# Patient Record
Sex: MALE | URBAN - METROPOLITAN AREA
[De-identification: names, ages, dates, MRNs, and addresses within clinical notes are randomized per-mention and may not be internally consistent; named-entity substitution may affect disease eponyms.]

---

## 2021-01-07 ENCOUNTER — OFFICE VISIT (OUTPATIENT)
Dept: URBAN - METROPOLITAN AREA CLINIC 88 | Facility: CLINIC | Age: 76
End: 2021-01-07
Payer: MEDICARE

## 2021-01-07 DIAGNOSIS — H04.123 DRY EYE SYNDROME OF BILATERAL LACRIMAL GLANDS: Chronic | ICD-10-CM

## 2021-01-07 DIAGNOSIS — H25.13 AGE-RELATED NUCLEAR CATARACT, BILATERAL: Chronic | ICD-10-CM

## 2021-01-07 DIAGNOSIS — H01.8 OTHER SPECIFIED INFLAMMATIONS OF EYELID: Chronic | ICD-10-CM

## 2021-01-07 PROCEDURE — 99204 OFFICE O/P NEW MOD 45 MIN: CPT | Performed by: OPTOMETRIST

## 2021-01-07 ASSESSMENT — KERATOMETRY
OD: 43.63
OS: 43.75

## 2021-01-07 ASSESSMENT — INTRAOCULAR PRESSURE
OS: 13
OD: 14
OS: 14

## 2021-01-07 ASSESSMENT — VISUAL ACUITY: OS: 20/25

## 2021-01-07 NOTE — IMPRESSION/PLAN
Impression: Other specified inflammations of eyelid: H01.8. Plan: Discussed cause of ocular irritation with patient in detail. Lid hygiene to be performed with baby shampoo and a soft cloth to clean the lash and lid margin. Discussed changing bed linens.

## 2021-01-07 NOTE — IMPRESSION/PLAN
Impression: Open angle with borderline findings, low risk, bilateral: H40.013. Plan: Mild asymmetry of CD ratio. Low IOP. OCT attempted, unable.  Baseline fundus photos at next exam.

## 2021-01-07 NOTE — IMPRESSION/PLAN
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 Plan: Patient educated to condition. Pt reports having scan of the brain with normal results. No surgery is recommended at that time. Patient knows to RTC if symptoms of decreased side vision is experienced.

## 2021-01-07 NOTE — IMPRESSION/PLAN
Impression: Conjunctivitis - Allergic: H10.413. Plan: Discussed status in detail. Taper Prednisolone BID OS x 3 days, d/c Polytrim. Recommend cool compresses and Pataday QD OU.

## 2021-02-25 ENCOUNTER — OFFICE VISIT (OUTPATIENT)
Dept: URBAN - METROPOLITAN AREA CLINIC 88 | Facility: CLINIC | Age: 76
End: 2021-02-25
Payer: MEDICARE

## 2021-02-25 DIAGNOSIS — H10.413 CONJUNCTIVITIS - ALLERGIC: Primary | ICD-10-CM

## 2021-02-25 DIAGNOSIS — H43.811 VITREOUS DEGENERATION, RIGHT EYE: Chronic | ICD-10-CM

## 2021-02-25 DIAGNOSIS — H40.013 OPEN ANGLE WITH BORDERLINE FINDINGS, LOW RISK, BILATERAL: ICD-10-CM

## 2021-02-25 PROCEDURE — 99214 OFFICE O/P EST MOD 30 MIN: CPT | Performed by: OPTOMETRIST

## 2021-02-25 RX ORDER — OLOPATADINE HYDROCHLORIDE 1 MG/ML
0.1 % SOLUTION/ DROPS OPHTHALMIC
Qty: 5 | Refills: 5 | Status: INACTIVE
Start: 2021-02-25 | End: 2021-03-02

## 2021-02-25 ASSESSMENT — INTRAOCULAR PRESSURE
OD: 13
OS: 13

## 2021-02-25 NOTE — IMPRESSION/PLAN
Impression: Conjunctivitis - Allergic: H10.413. Plan: Discussed condition with patient in detail. Recommend treatment with Preservative Free Artificial tears QID. Pataday sent to pharmacy at pt request.  RTC if symptoms increase or any new problems experienced.

## 2021-02-25 NOTE — IMPRESSION/PLAN
Impression: Open angle with borderline findings, low risk, bilateral: H40.013. Plan: Discussed status of examination with patient. Risk based on ON appearance. Baseline OCT today. Low signal strength with asymmetry of disc size. Some data drop out OS. Low pressure and no family history. Patient to follow up with primary eye care provider in [place]. Recommend additional glaucoma monitoring with pachymetry, gonioscopy, VF 24-2, and ON OCT (repeat) in 9 months after returning from [place]. Patient understands risk associated with condition and need for monitoring.

## 2021-02-25 NOTE — IMPRESSION/PLAN
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 Plan: Discussed status, normal pupil with no signs of Maurizio's. Referral for surgery deferred today by patient.

## 2022-03-03 ENCOUNTER — OFFICE VISIT (OUTPATIENT)
Dept: URBAN - METROPOLITAN AREA CLINIC 88 | Facility: CLINIC | Age: 77
End: 2022-03-03
Payer: MEDICARE

## 2022-03-03 DIAGNOSIS — H43.813 VITREOUS DEGENERATION, BILATERAL: ICD-10-CM

## 2022-03-03 DIAGNOSIS — H02.412 MECHANICAL PTOSIS OF LEFT EYELID: ICD-10-CM

## 2022-03-03 PROCEDURE — 99213 OFFICE O/P EST LOW 20 MIN: CPT | Performed by: OPTOMETRIST

## 2022-03-03 PROCEDURE — 76514 ECHO EXAM OF EYE THICKNESS: CPT | Performed by: OPTOMETRIST

## 2022-03-03 PROCEDURE — 92133 CPTRZD OPH DX IMG PST SGM ON: CPT | Performed by: OPTOMETRIST

## 2022-03-03 ASSESSMENT — INTRAOCULAR PRESSURE
OD: 13
OS: 13

## 2022-03-03 NOTE — IMPRESSION/PLAN
Impression: Open angle with borderline findings, low risk, bilateral: H40.013. Plan: Discussed status of examination with patient. Risk based on ON appearance. OCT today, stable vs last. Pachy, thick OU. Low pressure and no family history. Patient to follow up with primary eye care provider in [place]. Patient understands risk associated with condition and need for monitoring.

## 2023-10-13 ENCOUNTER — HOSPITAL ENCOUNTER (OUTPATIENT)
Dept: RADIOLOGY | Facility: HOSPITAL | Age: 78
Discharge: HOME | End: 2023-10-13
Payer: MEDICARE

## 2023-10-13 DIAGNOSIS — T84.498A OTHER MECHANICAL COMPLICATION OF OTHER INTERNAL ORTHOPEDIC DEVICES, IMPLANTS AND GRAFTS, INITIAL ENCOUNTER (CMS-HCC): ICD-10-CM

## 2023-10-13 PROCEDURE — 72128 CT CHEST SPINE W/O DYE: CPT

## 2023-10-13 PROCEDURE — 72146 MRI CHEST SPINE W/O DYE: CPT

## 2023-10-18 ENCOUNTER — PREP FOR PROCEDURE (OUTPATIENT)
Dept: PREOP | Facility: HOSPITAL | Age: 78
End: 2023-10-18
Payer: MEDICARE

## 2023-10-18 DIAGNOSIS — T84.498S FAILED ORTHOPEDIC IMPLANT, SEQUELA: Primary | ICD-10-CM

## 2023-10-18 PROBLEM — T84.498A FAILED ORTHOPEDIC IMPLANT (CMS-HCC): Status: ACTIVE | Noted: 2023-10-18

## 2023-11-01 ENCOUNTER — CLINICAL SUPPORT (OUTPATIENT)
Dept: PREADMISSION TESTING | Facility: HOSPITAL | Age: 78
End: 2023-11-01
Payer: MEDICARE

## 2023-11-01 VITALS
HEART RATE: 82 BPM | OXYGEN SATURATION: 96 % | SYSTOLIC BLOOD PRESSURE: 101 MMHG | TEMPERATURE: 96.8 F | BODY MASS INDEX: 25.11 KG/M2 | WEIGHT: 169.53 LBS | DIASTOLIC BLOOD PRESSURE: 67 MMHG | HEIGHT: 69 IN | RESPIRATION RATE: 18 BRPM

## 2023-11-01 DIAGNOSIS — Z01.818 PREOPERATIVE TESTING: Primary | ICD-10-CM

## 2023-11-01 LAB
ABO GROUP (TYPE) IN BLOOD: NORMAL
ANION GAP SERPL CALC-SCNC: 8 MMOL/L
ANTIBODY SCREEN: NORMAL
APPEARANCE UR: CLEAR
BILIRUB UR STRIP.AUTO-MCNC: NEGATIVE MG/DL
BUN SERPL-MCNC: 36 MG/DL (ref 8–25)
CALCIUM SERPL-MCNC: 9.5 MG/DL (ref 8.5–10.4)
CHLORIDE SERPL-SCNC: 106 MMOL/L (ref 97–107)
CO2 SERPL-SCNC: 26 MMOL/L (ref 24–31)
COLOR UR: NORMAL
CREAT SERPL-MCNC: 1.1 MG/DL (ref 0.4–1.6)
ERYTHROCYTE [DISTWIDTH] IN BLOOD BY AUTOMATED COUNT: 14.3 % (ref 11.5–14.5)
GFR SERPL CREATININE-BSD FRML MDRD: 70 ML/MIN/1.73M*2
GLUCOSE SERPL-MCNC: 113 MG/DL (ref 65–99)
GLUCOSE UR STRIP.AUTO-MCNC: NORMAL MG/DL
HCT VFR BLD AUTO: 37.5 % (ref 41–52)
HGB BLD-MCNC: 12.7 G/DL (ref 13.5–17.5)
KETONES UR STRIP.AUTO-MCNC: NEGATIVE MG/DL
LEUKOCYTE ESTERASE UR QL STRIP.AUTO: NEGATIVE
MCH RBC QN AUTO: 31.3 PG (ref 26–34)
MCHC RBC AUTO-ENTMCNC: 33.9 G/DL (ref 32–36)
MCV RBC AUTO: 92 FL (ref 80–100)
NITRITE UR QL STRIP.AUTO: NEGATIVE
NRBC BLD-RTO: 0 /100 WBCS (ref 0–0)
PH UR STRIP.AUTO: 5.5 [PH]
PLATELET # BLD AUTO: 198 X10*3/UL (ref 150–450)
PMV BLD AUTO: 9.9 FL (ref 7.5–11.5)
POTASSIUM SERPL-SCNC: 4.2 MMOL/L (ref 3.4–5.1)
PROT UR STRIP.AUTO-MCNC: NEGATIVE MG/DL
RBC # BLD AUTO: 4.06 X10*6/UL (ref 4.5–5.9)
RBC # UR STRIP.AUTO: NEGATIVE /UL
RH FACTOR (ANTIGEN D): NORMAL
SODIUM SERPL-SCNC: 140 MMOL/L (ref 133–145)
SP GR UR STRIP.AUTO: 1.02
UROBILINOGEN UR STRIP.AUTO-MCNC: NORMAL MG/DL
WBC # BLD AUTO: 6.7 X10*3/UL (ref 4.4–11.3)

## 2023-11-01 PROCEDURE — 81003 URINALYSIS AUTO W/O SCOPE: CPT

## 2023-11-01 PROCEDURE — 85027 COMPLETE CBC AUTOMATED: CPT

## 2023-11-01 PROCEDURE — 87081 CULTURE SCREEN ONLY: CPT | Mod: TRILAB

## 2023-11-01 PROCEDURE — 36415 COLL VENOUS BLD VENIPUNCTURE: CPT

## 2023-11-01 PROCEDURE — 99203 OFFICE O/P NEW LOW 30 MIN: CPT | Performed by: NURSE PRACTITIONER

## 2023-11-01 PROCEDURE — 86900 BLOOD TYPING SEROLOGIC ABO: CPT

## 2023-11-01 PROCEDURE — 80048 BASIC METABOLIC PNL TOTAL CA: CPT

## 2023-11-01 RX ORDER — FLUTICASONE PROPIONATE 50 MCG
2 SPRAY, SUSPENSION (ML) NASAL NIGHTLY PRN
COMMUNITY
End: 2023-11-08 | Stop reason: HOSPADM

## 2023-11-01 RX ORDER — FLUVOXAMINE MALEATE 100 MG/1
150 TABLET, COATED ORAL DAILY
COMMUNITY

## 2023-11-01 RX ORDER — PANTOPRAZOLE SODIUM 40 MG/1
40 TABLET, DELAYED RELEASE ORAL
COMMUNITY

## 2023-11-01 RX ORDER — BUPROPION HYDROCHLORIDE 150 MG/1
150 TABLET ORAL EVERY MORNING
COMMUNITY
End: 2023-11-08 | Stop reason: HOSPADM

## 2023-11-01 RX ORDER — ACETAMINOPHEN 500 MG
5000 TABLET ORAL DAILY
COMMUNITY

## 2023-11-01 RX ORDER — GUAIFENESIN 600 MG/1
600 TABLET, EXTENDED RELEASE ORAL 2 TIMES DAILY PRN
COMMUNITY
End: 2023-11-08 | Stop reason: HOSPADM

## 2023-11-01 RX ORDER — FLUVOXAMINE MALEATE 100 MG/1
50 TABLET, COATED ORAL NIGHTLY
COMMUNITY

## 2023-11-01 RX ORDER — CHLORHEXIDINE GLUCONATE ORAL RINSE 1.2 MG/ML
SOLUTION DENTAL
Qty: 473 ML | Refills: 0 | Status: SHIPPED | OUTPATIENT
Start: 2023-11-01

## 2023-11-01 ASSESSMENT — ENCOUNTER SYMPTOMS
NECK STIFFNESS: 1
EYES NEGATIVE: 1
GASTROINTESTINAL NEGATIVE: 1
BACK PAIN: 1
PSYCHIATRIC NEGATIVE: 1
NEUROLOGICAL NEGATIVE: 1
ACTIVITY CHANGE: 1
RESPIRATORY NEGATIVE: 1
CARDIOVASCULAR NEGATIVE: 1
ARTHRALGIAS: 1

## 2023-11-01 ASSESSMENT — DUKE ACTIVITY SCORE INDEX (DASI)
CAN YOU DO YARD WORK LIKE RAKING LEAVES, WEEDING OR PUSHING A MOWER: NO
CAN YOU WALK A BLOCK OR TWO ON LEVEL GROUND: YES
CAN YOU DO HEAVY WORK AROUND THE HOUSE LIKE SCRUBBING FLOORS OR LIFTING AND MOVING HEAVY FURNITURE: NO
CAN YOU DO LIGHT WORK AROUND THE HOUSE LIKE DUSTING OR WASHING DISHES: YES
DASI METS SCORE: 6.1
TOTAL_SCORE: 26.95
CAN YOU WALK INDOORS, SUCH AS AROUND YOUR HOUSE: YES
CAN YOU HAVE SEXUAL RELATIONS: NO
CAN YOU RUN A SHORT DISTANCE: YES
CAN YOU PARTICIPATE IN STRENOUS SPORTS LIKE SWIMMING, SINGLES TENNIS, FOOTBALL, BASKETBALL, OR SKIING: NO
CAN YOU PARTICIPATE IN MODERATE RECREATIONAL ACTIVITIES LIKE GOLF, BOWLING, DANCING, DOUBLES TENNIS OR THROWING A BASEBALL OR FOOTBALL: NO
CAN YOU TAKE CARE OF YOURSELF (EAT, DRESS, BATHE, OR USE TOILET): YES
CAN YOU DO MODERATE WORK AROUND THE HOUSE LIKE VACUUMING, SWEEPING FLOORS OR CARRYING GROCERIES: YES
CAN YOU CLIMB A FLIGHT OF STAIRS OR WALK UP A HILL: YES

## 2023-11-01 ASSESSMENT — CHADS2 SCORE
HYPERTENSION: NO
CHADS2 SCORE: 1
DIABETES: NO
CHF: NO
PRIOR STROKE OR TIA OR THROMBOEMBOLISM: NO
AGE GREATER THAN OR EQUAL TO 75: YES

## 2023-11-01 ASSESSMENT — PAIN - FUNCTIONAL ASSESSMENT: PAIN_FUNCTIONAL_ASSESSMENT: 0-10

## 2023-11-01 ASSESSMENT — PAIN SCALES - GENERAL: PAINLEVEL_OUTOF10: 1

## 2023-11-01 ASSESSMENT — PAIN DESCRIPTION - DESCRIPTORS: DESCRIPTORS: ACHING

## 2023-11-01 NOTE — PREPROCEDURE INSTRUCTIONS
PAT DISCHARGE INSTRUCTIONS    Please call the Same Day Surgery (SDS) Department of the hospital where your procedure will be performed after 2:00 PM the day before your surgery. If you are scheduled on a Monday, or a Tuesday following a Monday holiday, you will need to call on the last business day prior to your surgery.    [facility]  [id number] [place] [address], [address]  [phone]    [address] [address] [phone]    [facility]  [address]  [place] [id number]  [phone]    Please let your surgeon know if:      You develop any open sores, shingles, burning or painful urination as these may increase your risk of an infection.   You no longer wish to have the surgery.   Any other personal circumstances change that may lead to the need to cancel or defer this surgery-such as being sick or getting admitted to any hospital within one week of your planned procedure.    Your contact details change, such as a change of address or phone number.    Starting now:     Please DO NOT drink alcohol or smoke for 24 hours before surgery. It is well known that quitting smoking can make a huge difference to your health and recovery from surgery. The longer you abstain from smoking, the better your chances of a healthy recovery. If you need help with quitting, call 5-800QUIT-NOW to be connected to a trained counselor who will discuss the best methods to help you quit.     Before your surgery:    Please stop all supplements 7 days prior to surgery. Or as directed by your surgeon.   Please stop taking NSAID pain medicine such as Advil and Motrin 7 days before surgery.    If you develop any fever, cough, cold, rashes, cuts, scratches, scrapes, urinary symptoms or infection anywhere on your body (including teeth and gums) prior to surgery, please call your surgeon’s office as soon as possible. This may require treatment to reduce the chance of  cancellation on the day of surgery.    The day before your surgery:   DIET- Do not eat any food after MIDNIGHT. May have 10 ounces of CLEAR LIQUIDS until TWO HOURS before your arrival time. This includes water, black tea or coffee (no milk ir cream), apple juice and electrolyte drinks (Gatorade). May chew gum until TWO hours before your surgery time.   Get a good night’s rest.  Use the special soap for bathing if you have been instructed to use one.    Scheduled surgery times may change and you will be notified if this occurs - please check your personal voicemail for any updates.     On the morning of surgery:   Wear comfortable, loose fitting clothes which open in the front. Please do not wear moisturizers, creams, lotions, makeup or perfume.    Please bring with you to surgery:   Photo ID and insurance card   Current list of medicines and allergies   Pacemaker/ Defibrillator/Heart stent cards   CPAP machine and mask    Slings/ splints/ crutches   A copy of your complete advanced directive/DHPOA.    Please do NOT bring with you to surgery:   All jewelry and valuables should be left at home.   Prosthetic devices such as contact lenses, hearing aids, dentures, eyelash extensions, hairpins and body piercings must be removed prior to going in to the surgical suite.    After outpatient surgery:   A responsible adult MUST accompany you at the time of discharge and stay with you for 24 hours after your surgery. You may NOT drive yourself home after surgery.    Do not drive, operate machinery, make critical decisions or do activities that require co-ordination or balance until after a night’s sleep.   Do not drink alcoholic beverages for 24 hours.   Instructions for resuming your medications will be provided by your surgeon.    CALL YOUR DOCTOR AFTER SURGERY IF YOU HAVE:     Chills and/or a fever of 101° F or higher.    Redness, swelling, pus or drainage from your surgical wound or a bad smell from the wound.     Lightheadedness, fainting or confusion.    Persistent vomiting (throwing up) and are not able to eat or drink for 12 hours.    Three or more loose, watery bowel movements in 24 hours (diarrhea).   Difficulty or pain while urinating( after non-urological surgery)    Pain and swelling in your legs, especially if it is only on one side.    Difficulty breathing or are breathing faster than normal.    Any new concerning symptoms.       Home Preoperative Antibacterial Shower    What is a home preoperative antibacterial shower?  This shower is a way of cleaning the skin with a germ killing solution before surgery. The solution contains chlorhexidine, commonly known as CHG. CHG is a skin cleanser with germ killing ability. Let your doctor know if you are allergic to chlorhexidine.      Why do I need to take a preoperative antibacterial shower?  Skin is not sterile. It is best to try to make your skin as free of germs as possible before surgery. Proper cleansing with a germ killing soap before surgery can lower the number of germs on your skin. This helps to reduce the risk of infection at the surgical site. Following the instructions listed below will help you prepare your skin for surgery.    How do I use the solution?      Steps: Begin using your CHG soap FIVE DAYS BEFORE your scheduled surgery on __________________________________________________.  First, wash and rinse your hair using the CHG soap. Keep CHG away soap away from ear canals and eyes.  Rinse completely, do not condition. Hair extensions should be removed.  Wash your face with your normal soap and rinse.  Apply the CHG solution to a clean wet washcloth. Turn the water off or move away from the water spray to avoid premature rinsing of the CHG soap as you are applying.  Firmly lather your entire body from neck down. Do not use on face.  Pay special attention to the areas(s) where your incision(s) will be located unless they are on your face.  Avoid scrubbing  your skin too hard.  The important point is to have the CHG soap sit on your skin for 3 minutes.  DO NOT wash with regular soap after you have used the CHG soap solution.  Pat yourself dry with a clean, freshly laundered towel.  DO NOT apply powders, deodorants or lotions.  Dress in clean, freshly laundered night clothes.  Be sure to sleep with clean, freshly laundered sheets.  Be aware that CHG will cause stains on fabrics; if you wash them with bleach after use. Rinse your washcloth and other linens that have contact with CHG completely. Use only non-chlorine detergents to launder the items used.  The morning of surgery is the fifth day. Repeat the above steps and dress in clean comfortable clothing.      Who should I call if I have any questions regarding the use of CHG soap?  Call the [facility] at [phone] if you have any questions.       Patient Information: Oral/Dental Rinse    What is oral/dental rinse?  It is a mouthwash. It is a way of cleaning the mouth with a germ killing solution before your surgery. The solution contains chlorhexidine, commonly know as CHG.  It is used inside the mouth to kill bacteria known as Staphylococcus aureus.  Let your doctor know if you are allergic to chlorhexidine.    Why do I need to use CHG oral/dental rinse?  The CHG oral/dental rinse helps to kill bacteria in your mouth known as Staphylococcus aureus. This reduces the risk of infection at the surgical site.    Using your CHG oral/dental rinse.  STEPS: use your CHG oral/dental rinse after you brush your teeth the night before (at bedtime) and the morning of your surgery. Follow the directions on your prescription label.  Use the cap on the container to measure 15ml (fill cap to fill line)  Swish (gargle if you can) the mouthwash in your mouth for at least 30 seconds, (do not swallow) spit out.  After you use your CHG rinse, do not rinse your mouth  with water, drink or eat. Please refer to prescription label for the appropriate time to resume oral intake.    What side effects might I have using the CHG oral/dental rinse?  CHG rinse will stick to the plaque on the teeth. Brush and floss just before use. Teeth brushing will help avoid staining of plaque during use.    Who should I contact if I have questions about the CHG oral/dental rinse?  Please call [facility], [facility] at [phone] if you have any questions.

## 2023-11-01 NOTE — H&P (VIEW-ONLY)
CPM/PAT Evaluation       Name: Kasi Roper (Kasi Roper)  /Age: 1945/78 y.o.     In-Person       Chief Complaint: Failed orthopedic implant, sequela     HPI  A 78-year-old male with failed orthopedic implant, sequela. History of T12 compression fracture, chronic back pain, sciatica s/p previous back surgery x 2. He has had protrusion of hardware and pain in mid-back over the past year especially with sitting against hard surfaces.  Denies cardiac history, cp, sob, syncope, fever, chills, radiating pain or dysfunction of bladder or bowel. He is scheduled for removal Left T11-12 screws and Richie - Left     Past Medical History:   Diagnosis Date    Bañuelos esophagus     Bipolar 1 disorder (CMS/HCC)     BPH (benign prostatic hyperplasia)     Chronic back pain     T12 compression fracture    Diverticulitis     s/p partial colectomy     DJD (degenerative joint disease)     GERD (gastroesophageal reflux disease)     OAB (overactive bladder)     Trigger finger        Past Surgical History:   Procedure Laterality Date    COLONOSCOPY      ESOPHAGOGASTRODUODENOSCOPY      HERNIA REPAIR      bilateral inguinal Right x 2 Left x 1    LUMBAR FUSION      LUMBAR LAMINECTOMY           Allergies   Allergen Reactions    Other Itching and Runny nose     HAYFEVER     Current Outpatient Medications   Medication Sig Dispense Refill    alfuzosin HCl (UROXATRAL ORAL) Take 20 mg by mouth once daily at bedtime.      buPROPion XL (Wellbutrin XL) 150 mg 24 hr tablet Take 1 tablet (150 mg) by mouth once daily in the morning. Do not crush, chew, or split.      chlorhexidine (Peridex) 0.12 % solution Use cap to measure 15 mL.  Swish/gargle mouthwash for at least 30 seconds.  Do not swallow.  Use night before surgery after brushing teeth and morning of surgery after brushing teeth. 473 mL 0    cholecalciferol (Vitamin D-3) 5,000 Units tablet Take 1 tablet (5,000 Units) by mouth once daily.      fluticasone (Flonase) 50  mcg/actuation nasal spray Administer 2 sprays into each nostril as needed at bedtime for rhinitis. Shake gently. Before first use, prime pump. After use, clean tip and replace cap.      fLuvoxaMINE (Luvox) 100 mg tablet Take 1.5 tablets (150 mg) by mouth once daily.      fLuvoxaMINE (Luvox) 100 mg tablet Take 0.5 tablets (50 mg) by mouth once daily at bedtime.      guaiFENesin (Mucinex) 600 mg 12 hr tablet Take 1 tablet (600 mg) by mouth 2 times a day as needed for cough. Do not crush, chew, or split.      pantoprazole (ProtoNix) 40 mg EC tablet Take 1 tablet (40 mg) by mouth once daily in the morning. Take before meals. Do not crush, chew, or split.       No current facility-administered medications for this visit.      Review of Systems   Constitutional:  Positive for activity change.   HENT: Negative.     Eyes: Negative.         Glasses   Respiratory: Negative.     Cardiovascular: Negative.    Gastrointestinal: Negative.    Genitourinary: Negative.    Musculoskeletal:  Positive for arthralgias, back pain (midback pain) and neck stiffness.   Skin: Negative.    Neurological: Negative.    Psychiatric/Behavioral: Negative.          Physical Exam  Vitals reviewed.   Constitutional:       Appearance: Normal appearance.   HENT:      Head: Normocephalic and atraumatic.      Mouth/Throat:      Mouth: Mucous membranes are moist.   Eyes:      Pupils: Pupils are equal, round, and reactive to light.      Comments: glasses   Neck:      Comments: Neck stiffness, decreased rom  Cardiovascular:      Rate and Rhythm: Normal rate and regular rhythm.      Heart sounds: Normal heart sounds.   Pulmonary:      Effort: Pulmonary effort is normal.      Breath sounds: Normal breath sounds.   Abdominal:      Palpations: Abdomen is soft.   Musculoskeletal:      Comments: Chronic back pain, midback hardware visible   Skin:     General: Skin is warm and dry.   Neurological:      Mental Status: He is alert and oriented to person, place, and  "time.   Psychiatric:         Mood and Affect: Mood normal.          PAT AIRWAY:   Airway:     Mallampati::  II    Neck ROM::  Limited  normal        /67   Pulse 82   Temp 36 °C (96.8 °F)   Resp 18   Ht 1.753 m (5' 9\")   Wt 76.9 kg (169 lb 8.5 oz)   SpO2 96%   BMI 25.04 kg/m²       Stop Bang Score      Flowsheet Row Most Recent Value   Do you snore loudly? 1   Do you often feel tired or fatigued after your sleep? 1   Has anyone ever observed you stop breathing in your sleep? 0   Do you have or are you being treated for high blood pressure? 1   Recent BMI (Calculated) 25.2   Is BMI greater than 35 kg/m2? 0=No   Age older than 50 years old? 1=Yes   Is your neck circumference greater than 17 inches (Male) or 16 inches (Female)? 0        CHADS 2 score: 1-2.8%  DASI score: 26.95  METS score: 6.1  Revised cardiac risk index: 0.9%  ASA II  ARISCAT 1.6%    Assessment and Plan:     Failed orthopedic implant, sequela Plan: Left T11-12 screws and Richie - Left   Bipolar  Chronic back pain  GERD, Bañuelos's  Overactive bladder  BPH  H/o diverticulitis s/p partial colectomy 2021        "

## 2023-11-03 LAB — STAPHYLOCOCCUS SPEC CULT: NORMAL

## 2023-11-06 ENCOUNTER — PREP FOR PROCEDURE (OUTPATIENT)
Dept: PREOP | Facility: HOSPITAL | Age: 78
End: 2023-11-06
Payer: MEDICARE

## 2023-11-06 RX ORDER — MORPHINE SULFATE IN 0.9 % NACL 30 MG/30ML
PATIENT CONTROLLED ANALGESIA SYRINGE INTRAVENOUS CONTINUOUS
Status: CANCELLED | OUTPATIENT
Start: 2023-11-06

## 2023-11-06 RX ORDER — CEFAZOLIN SODIUM 2 G/100ML
2 INJECTION, SOLUTION INTRAVENOUS EVERY 6 HOURS
Status: CANCELLED | OUTPATIENT
Start: 2023-11-06 | End: 2023-11-07

## 2023-11-06 RX ORDER — ALVIMOPAN 12 MG/1
12 CAPSULE ORAL ONCE
Status: CANCELLED | OUTPATIENT
Start: 2023-11-06 | End: 2023-11-06

## 2023-11-06 RX ORDER — SODIUM CHLORIDE, SODIUM LACTATE, POTASSIUM CHLORIDE, CALCIUM CHLORIDE 600; 310; 30; 20 MG/100ML; MG/100ML; MG/100ML; MG/100ML
100 INJECTION, SOLUTION INTRAVENOUS CONTINUOUS
Status: CANCELLED | OUTPATIENT
Start: 2023-11-06

## 2023-11-06 RX ORDER — NALOXONE HYDROCHLORIDE 0.4 MG/ML
0.2 INJECTION, SOLUTION INTRAMUSCULAR; INTRAVENOUS; SUBCUTANEOUS AS NEEDED
Status: CANCELLED | OUTPATIENT
Start: 2023-11-06

## 2023-11-07 ENCOUNTER — ANESTHESIA EVENT (OUTPATIENT)
Dept: OPERATING ROOM | Facility: HOSPITAL | Age: 78
DRG: 497 | End: 2023-11-07
Payer: MEDICARE

## 2023-11-07 ENCOUNTER — HOSPITAL ENCOUNTER (INPATIENT)
Facility: HOSPITAL | Age: 78
LOS: 1 days | Discharge: HOME | DRG: 497 | End: 2023-11-08
Attending: ORTHOPAEDIC SURGERY | Admitting: ORTHOPAEDIC SURGERY
Payer: MEDICARE

## 2023-11-07 ENCOUNTER — ANESTHESIA (OUTPATIENT)
Dept: OPERATING ROOM | Facility: HOSPITAL | Age: 78
DRG: 497 | End: 2023-11-07
Payer: MEDICARE

## 2023-11-07 DIAGNOSIS — T84.498A FAILED ORTHOPEDIC IMPLANT, INITIAL ENCOUNTER (CMS-HCC): Primary | ICD-10-CM

## 2023-11-07 PROCEDURE — 99100 ANES PT EXTEME AGE<1 YR&>70: CPT | Performed by: ANESTHESIOLOGY

## 2023-11-07 PROCEDURE — 2500000001 HC RX 250 WO HCPCS SELF ADMINISTERED DRUGS (ALT 637 FOR MEDICARE OP): Performed by: PHYSICIAN ASSISTANT

## 2023-11-07 PROCEDURE — 2500000005 HC RX 250 GENERAL PHARMACY W/O HCPCS: Performed by: ANESTHESIOLOGY

## 2023-11-07 PROCEDURE — 2500000004 HC RX 250 GENERAL PHARMACY W/ HCPCS (ALT 636 FOR OP/ED): Performed by: PHYSICIAN ASSISTANT

## 2023-11-07 PROCEDURE — 0PP404Z REMOVAL OF INTERNAL FIXATION DEVICE FROM THORACIC VERTEBRA, OPEN APPROACH: ICD-10-PCS | Performed by: ORTHOPAEDIC SURGERY

## 2023-11-07 PROCEDURE — 2720000007 HC OR 272 NO HCPCS: Performed by: ORTHOPAEDIC SURGERY

## 2023-11-07 PROCEDURE — 2500000001 HC RX 250 WO HCPCS SELF ADMINISTERED DRUGS (ALT 637 FOR MEDICARE OP): Performed by: ORTHOPAEDIC SURGERY

## 2023-11-07 PROCEDURE — 7100000002 HC RECOVERY ROOM TIME - EACH INCREMENTAL 1 MINUTE: Performed by: ORTHOPAEDIC SURGERY

## 2023-11-07 PROCEDURE — 94760 N-INVAS EAR/PLS OXIMETRY 1: CPT

## 2023-11-07 PROCEDURE — A4217 STERILE WATER/SALINE, 500 ML: HCPCS | Performed by: ORTHOPAEDIC SURGERY

## 2023-11-07 PROCEDURE — 94762 N-INVAS EAR/PLS OXIMTRY CONT: CPT

## 2023-11-07 PROCEDURE — 3600000003 HC OR TIME - INITIAL BASE CHARGE - PROCEDURE LEVEL THREE: Performed by: ORTHOPAEDIC SURGERY

## 2023-11-07 PROCEDURE — 7100000001 HC RECOVERY ROOM TIME - INITIAL BASE CHARGE: Performed by: ORTHOPAEDIC SURGERY

## 2023-11-07 PROCEDURE — 97165 OT EVAL LOW COMPLEX 30 MIN: CPT | Mod: GO

## 2023-11-07 PROCEDURE — 9420000001 HC RT PATIENT EDUCATION 5 MIN

## 2023-11-07 PROCEDURE — 2500000004 HC RX 250 GENERAL PHARMACY W/ HCPCS (ALT 636 FOR OP/ED): Performed by: ANESTHESIOLOGY

## 2023-11-07 PROCEDURE — 2500000004 HC RX 250 GENERAL PHARMACY W/ HCPCS (ALT 636 FOR OP/ED): Performed by: ORTHOPAEDIC SURGERY

## 2023-11-07 PROCEDURE — 2500000005 HC RX 250 GENERAL PHARMACY W/O HCPCS: Performed by: ORTHOPAEDIC SURGERY

## 2023-11-07 PROCEDURE — 3600000008 HC OR TIME - EACH INCREMENTAL 1 MINUTE - PROCEDURE LEVEL THREE: Performed by: ORTHOPAEDIC SURGERY

## 2023-11-07 PROCEDURE — 3700000001 HC GENERAL ANESTHESIA TIME - INITIAL BASE CHARGE: Performed by: ORTHOPAEDIC SURGERY

## 2023-11-07 PROCEDURE — 1100000001 HC PRIVATE ROOM DAILY

## 2023-11-07 PROCEDURE — A22852 PR REMOVE SPINE FIX DEV,POST SGMTAL: Performed by: ANESTHESIOLOGY

## 2023-11-07 PROCEDURE — 3700000002 HC GENERAL ANESTHESIA TIME - EACH INCREMENTAL 1 MINUTE: Performed by: ORTHOPAEDIC SURGERY

## 2023-11-07 PROCEDURE — 97161 PT EVAL LOW COMPLEX 20 MIN: CPT | Mod: GP

## 2023-11-07 RX ORDER — ALVIMOPAN 12 MG/1
12 CAPSULE ORAL ONCE
Status: COMPLETED | OUTPATIENT
Start: 2023-11-07 | End: 2023-11-07

## 2023-11-07 RX ORDER — GLYCOPYRROLATE 0.2 MG/ML
INJECTION INTRAMUSCULAR; INTRAVENOUS AS NEEDED
Status: DISCONTINUED | OUTPATIENT
Start: 2023-11-07 | End: 2023-11-07

## 2023-11-07 RX ORDER — DIPHENHYDRAMINE HYDROCHLORIDE 50 MG/ML
12.5 INJECTION INTRAMUSCULAR; INTRAVENOUS EVERY 6 HOURS PRN
Status: DISCONTINUED | OUTPATIENT
Start: 2023-11-07 | End: 2023-11-08 | Stop reason: HOSPADM

## 2023-11-07 RX ORDER — CYCLOBENZAPRINE HCL 10 MG
10 TABLET ORAL 3 TIMES DAILY PRN
Status: DISCONTINUED | OUTPATIENT
Start: 2023-11-07 | End: 2023-11-08 | Stop reason: HOSPADM

## 2023-11-07 RX ORDER — CEFAZOLIN SODIUM 2 G/100ML
2 INJECTION, SOLUTION INTRAVENOUS EVERY 6 HOURS
Status: CANCELLED | OUTPATIENT
Start: 2023-11-07 | End: 2023-11-08

## 2023-11-07 RX ORDER — VANCOMYCIN HYDROCHLORIDE 1 G/20ML
INJECTION, POWDER, LYOPHILIZED, FOR SOLUTION INTRAVENOUS AS NEEDED
Status: DISCONTINUED | OUTPATIENT
Start: 2023-11-07 | End: 2023-11-07 | Stop reason: HOSPADM

## 2023-11-07 RX ORDER — SODIUM CHLORIDE, SODIUM LACTATE, POTASSIUM CHLORIDE, CALCIUM CHLORIDE 600; 310; 30; 20 MG/100ML; MG/100ML; MG/100ML; MG/100ML
100 INJECTION, SOLUTION INTRAVENOUS CONTINUOUS
Status: DISCONTINUED | OUTPATIENT
Start: 2023-11-07 | End: 2023-11-08 | Stop reason: HOSPADM

## 2023-11-07 RX ORDER — FLUTICASONE PROPIONATE 50 MCG
2 SPRAY, SUSPENSION (ML) NASAL NIGHTLY PRN
Status: DISCONTINUED | OUTPATIENT
Start: 2023-11-07 | End: 2023-11-08 | Stop reason: HOSPADM

## 2023-11-07 RX ORDER — ALFUZOSIN HYDROCHLORIDE 10 MG/1
20 TABLET, EXTENDED RELEASE ORAL NIGHTLY
Status: DISCONTINUED | OUTPATIENT
Start: 2023-11-07 | End: 2023-11-08 | Stop reason: HOSPADM

## 2023-11-07 RX ORDER — LIDOCAINE HYDROCHLORIDE 20 MG/ML
INJECTION, SOLUTION INFILTRATION; PERINEURAL AS NEEDED
Status: DISCONTINUED | OUTPATIENT
Start: 2023-11-07 | End: 2023-11-07

## 2023-11-07 RX ORDER — MORPHINE SULFATE IN 0.9 % NACL 30 MG/30ML
PATIENT CONTROLLED ANALGESIA SYRINGE INTRAVENOUS CONTINUOUS
Status: DISCONTINUED | OUTPATIENT
Start: 2023-11-07 | End: 2023-11-08

## 2023-11-07 RX ORDER — FLUVOXAMINE MALEATE 50 MG/1
150 TABLET, FILM COATED ORAL DAILY
Status: DISCONTINUED | OUTPATIENT
Start: 2023-11-08 | End: 2023-11-08 | Stop reason: HOSPADM

## 2023-11-07 RX ORDER — POLYETHYLENE GLYCOL 3350 17 G/17G
17 POWDER, FOR SOLUTION ORAL 2 TIMES DAILY
Status: DISCONTINUED | OUTPATIENT
Start: 2023-11-07 | End: 2023-11-08 | Stop reason: HOSPADM

## 2023-11-07 RX ORDER — BISACODYL 5 MG
10 TABLET, DELAYED RELEASE (ENTERIC COATED) ORAL DAILY PRN
Status: DISCONTINUED | OUTPATIENT
Start: 2023-11-07 | End: 2023-11-08 | Stop reason: HOSPADM

## 2023-11-07 RX ORDER — NEOSTIGMINE METHYLSULFATE 1 MG/ML
INJECTION, SOLUTION INTRAVENOUS AS NEEDED
Status: DISCONTINUED | OUTPATIENT
Start: 2023-11-07 | End: 2023-11-07

## 2023-11-07 RX ORDER — SODIUM CHLORIDE, SODIUM LACTATE, POTASSIUM CHLORIDE, CALCIUM CHLORIDE 600; 310; 30; 20 MG/100ML; MG/100ML; MG/100ML; MG/100ML
100 INJECTION, SOLUTION INTRAVENOUS CONTINUOUS
Status: DISCONTINUED | OUTPATIENT
Start: 2023-11-07 | End: 2023-11-07

## 2023-11-07 RX ORDER — PANTOPRAZOLE SODIUM 40 MG/1
40 TABLET, DELAYED RELEASE ORAL
Status: DISCONTINUED | OUTPATIENT
Start: 2023-11-08 | End: 2023-11-08 | Stop reason: HOSPADM

## 2023-11-07 RX ORDER — ONDANSETRON 4 MG/1
4 TABLET, ORALLY DISINTEGRATING ORAL EVERY 8 HOURS PRN
Status: DISCONTINUED | OUTPATIENT
Start: 2023-11-07 | End: 2023-11-08 | Stop reason: HOSPADM

## 2023-11-07 RX ORDER — GUAIFENESIN 600 MG/1
600 TABLET, EXTENDED RELEASE ORAL 2 TIMES DAILY PRN
Status: DISCONTINUED | OUTPATIENT
Start: 2023-11-07 | End: 2023-11-08 | Stop reason: HOSPADM

## 2023-11-07 RX ORDER — ONDANSETRON HYDROCHLORIDE 2 MG/ML
4 INJECTION, SOLUTION INTRAVENOUS EVERY 8 HOURS PRN
Status: DISCONTINUED | OUTPATIENT
Start: 2023-11-07 | End: 2023-11-08 | Stop reason: HOSPADM

## 2023-11-07 RX ORDER — ALUMINUM HYDROXIDE, MAGNESIUM HYDROXIDE, AND SIMETHICONE 1200; 120; 1200 MG/30ML; MG/30ML; MG/30ML
30 SUSPENSION ORAL EVERY 6 HOURS PRN
Status: DISCONTINUED | OUTPATIENT
Start: 2023-11-07 | End: 2023-11-08 | Stop reason: HOSPADM

## 2023-11-07 RX ORDER — LABETALOL HYDROCHLORIDE 5 MG/ML
5 INJECTION, SOLUTION INTRAVENOUS ONCE AS NEEDED
Status: DISCONTINUED | OUTPATIENT
Start: 2023-11-07 | End: 2023-11-07 | Stop reason: HOSPADM

## 2023-11-07 RX ORDER — FENTANYL CITRATE 50 UG/ML
INJECTION, SOLUTION INTRAMUSCULAR; INTRAVENOUS AS NEEDED
Status: DISCONTINUED | OUTPATIENT
Start: 2023-11-07 | End: 2023-11-07

## 2023-11-07 RX ORDER — BUPROPION HYDROCHLORIDE 150 MG/1
150 TABLET ORAL EVERY MORNING
Status: DISCONTINUED | OUTPATIENT
Start: 2023-11-08 | End: 2023-11-08 | Stop reason: HOSPADM

## 2023-11-07 RX ORDER — MIDAZOLAM HYDROCHLORIDE 1 MG/ML
INJECTION, SOLUTION INTRAMUSCULAR; INTRAVENOUS AS NEEDED
Status: DISCONTINUED | OUTPATIENT
Start: 2023-11-07 | End: 2023-11-07

## 2023-11-07 RX ORDER — ONDANSETRON HYDROCHLORIDE 2 MG/ML
4 INJECTION, SOLUTION INTRAVENOUS ONCE AS NEEDED
Status: DISCONTINUED | OUTPATIENT
Start: 2023-11-07 | End: 2023-11-07 | Stop reason: HOSPADM

## 2023-11-07 RX ORDER — PROPOFOL 10 MG/ML
INJECTION, EMULSION INTRAVENOUS AS NEEDED
Status: DISCONTINUED | OUTPATIENT
Start: 2023-11-07 | End: 2023-11-07

## 2023-11-07 RX ORDER — DOCUSATE SODIUM 100 MG/1
100 CAPSULE, LIQUID FILLED ORAL 2 TIMES DAILY
Status: DISCONTINUED | OUTPATIENT
Start: 2023-11-07 | End: 2023-11-08 | Stop reason: HOSPADM

## 2023-11-07 RX ORDER — HYDRALAZINE HYDROCHLORIDE 20 MG/ML
5 INJECTION INTRAMUSCULAR; INTRAVENOUS EVERY 30 MIN PRN
Status: DISCONTINUED | OUTPATIENT
Start: 2023-11-07 | End: 2023-11-07 | Stop reason: HOSPADM

## 2023-11-07 RX ORDER — NALOXONE HYDROCHLORIDE 0.4 MG/ML
0.2 INJECTION, SOLUTION INTRAMUSCULAR; INTRAVENOUS; SUBCUTANEOUS AS NEEDED
Status: DISCONTINUED | OUTPATIENT
Start: 2023-11-07 | End: 2023-11-08 | Stop reason: HOSPADM

## 2023-11-07 RX ORDER — CEFAZOLIN SODIUM 2 G/100ML
2 INJECTION, SOLUTION INTRAVENOUS EVERY 6 HOURS
Status: DISCONTINUED | OUTPATIENT
Start: 2023-11-07 | End: 2023-11-07 | Stop reason: HOSPADM

## 2023-11-07 RX ORDER — FENTANYL CITRATE 50 UG/ML
50 INJECTION, SOLUTION INTRAMUSCULAR; INTRAVENOUS EVERY 5 MIN PRN
Status: DISCONTINUED | OUTPATIENT
Start: 2023-11-07 | End: 2023-11-07 | Stop reason: HOSPADM

## 2023-11-07 RX ORDER — ROCURONIUM BROMIDE 10 MG/ML
INJECTION, SOLUTION INTRAVENOUS AS NEEDED
Status: DISCONTINUED | OUTPATIENT
Start: 2023-11-07 | End: 2023-11-07

## 2023-11-07 RX ORDER — FLUVOXAMINE MALEATE 50 MG/1
50 TABLET, FILM COATED ORAL NIGHTLY
Status: DISCONTINUED | OUTPATIENT
Start: 2023-11-07 | End: 2023-11-08 | Stop reason: HOSPADM

## 2023-11-07 RX ADMIN — LIDOCAINE HYDROCHLORIDE 40 MG: 20 INJECTION, SOLUTION INFILTRATION; PERINEURAL at 11:48

## 2023-11-07 RX ADMIN — ROCURONIUM BROMIDE 40 MG: 10 INJECTION, SOLUTION INTRAVENOUS at 11:48

## 2023-11-07 RX ADMIN — SODIUM CHLORIDE, POTASSIUM CHLORIDE, SODIUM LACTATE AND CALCIUM CHLORIDE: 600; 310; 30; 20 INJECTION, SOLUTION INTRAVENOUS at 12:37

## 2023-11-07 RX ADMIN — PROPOFOL 100 MG: 10 INJECTION, EMULSION INTRAVENOUS at 11:48

## 2023-11-07 RX ADMIN — Medication 2 L/MIN: at 15:00

## 2023-11-07 RX ADMIN — MIDAZOLAM HYDROCHLORIDE 2 MG: 1 INJECTION, SOLUTION INTRAMUSCULAR; INTRAVENOUS at 11:46

## 2023-11-07 RX ADMIN — FENTANYL CITRATE 50 MCG: 0.05 INJECTION, SOLUTION INTRAMUSCULAR; INTRAVENOUS at 12:14

## 2023-11-07 RX ADMIN — ALVIMOPAN 12 MG: 12 CAPSULE ORAL at 08:41

## 2023-11-07 RX ADMIN — FLUVOXAMINE MALEATE 50 MG: 50 TABLET, COATED ORAL at 21:39

## 2023-11-07 RX ADMIN — GLYCOPYRROLATE 0.4 MG: 0.2 INJECTION INTRAMUSCULAR; INTRAVENOUS at 12:47

## 2023-11-07 RX ADMIN — SODIUM CHLORIDE, POTASSIUM CHLORIDE, SODIUM LACTATE AND CALCIUM CHLORIDE: 600; 310; 30; 20 INJECTION, SOLUTION INTRAVENOUS at 11:39

## 2023-11-07 RX ADMIN — FENTANYL CITRATE 50 MCG: 0.05 INJECTION, SOLUTION INTRAMUSCULAR; INTRAVENOUS at 11:48

## 2023-11-07 RX ADMIN — SODIUM CHLORIDE, SODIUM LACTATE, POTASSIUM CHLORIDE, AND CALCIUM CHLORIDE 100 ML/HR: 600; 310; 30; 20 INJECTION, SOLUTION INTRAVENOUS at 14:50

## 2023-11-07 RX ADMIN — NEOSTIGMINE METHYLSULFATE 3 MG: 1 INJECTION, SOLUTION INTRAVENOUS at 12:47

## 2023-11-07 RX ADMIN — Medication: at 13:54

## 2023-11-07 RX ADMIN — DOCUSATE SODIUM 100 MG: 100 CAPSULE, LIQUID FILLED ORAL at 21:32

## 2023-11-07 RX ADMIN — ALFUZOSIN HYDROCHLORIDE 20 MG: 10 TABLET, EXTENDED RELEASE ORAL at 21:32

## 2023-11-07 RX ADMIN — CEFAZOLIN SODIUM 2 G: 2 INJECTION, SOLUTION INTRAVENOUS at 11:53

## 2023-11-07 SDOH — SOCIAL STABILITY: SOCIAL INSECURITY: ARE THERE ANY APPARENT SIGNS OF INJURIES/BEHAVIORS THAT COULD BE RELATED TO ABUSE/NEGLECT?: NO

## 2023-11-07 SDOH — SOCIAL STABILITY: SOCIAL INSECURITY: DOES ANYONE TRY TO KEEP YOU FROM HAVING/CONTACTING OTHER FRIENDS OR DOING THINGS OUTSIDE YOUR HOME?: NO

## 2023-11-07 SDOH — SOCIAL STABILITY: SOCIAL INSECURITY: DO YOU FEEL UNSAFE GOING BACK TO THE PLACE WHERE YOU ARE LIVING?: NO

## 2023-11-07 SDOH — SOCIAL STABILITY: SOCIAL INSECURITY: ABUSE: ADULT

## 2023-11-07 SDOH — HEALTH STABILITY: MENTAL HEALTH: CURRENT SMOKER: 0

## 2023-11-07 SDOH — SOCIAL STABILITY: SOCIAL INSECURITY: HAS ANYONE EVER THREATENED TO HURT YOUR FAMILY OR YOUR PETS?: NO

## 2023-11-07 SDOH — SOCIAL STABILITY: SOCIAL INSECURITY: ARE YOU OR HAVE YOU BEEN THREATENED OR ABUSED PHYSICALLY, EMOTIONALLY, OR SEXUALLY BY ANYONE?: NO

## 2023-11-07 SDOH — SOCIAL STABILITY: SOCIAL INSECURITY: DO YOU FEEL ANYONE HAS EXPLOITED OR TAKEN ADVANTAGE OF YOU FINANCIALLY OR OF YOUR PERSONAL PROPERTY?: NO

## 2023-11-07 SDOH — SOCIAL STABILITY: SOCIAL INSECURITY: HAVE YOU HAD THOUGHTS OF HARMING ANYONE ELSE?: NO

## 2023-11-07 SDOH — SOCIAL STABILITY: SOCIAL INSECURITY: WERE YOU ABLE TO COMPLETE ALL THE BEHAVIORAL HEALTH SCREENINGS?: YES

## 2023-11-07 ASSESSMENT — COGNITIVE AND FUNCTIONAL STATUS - GENERAL
DRESSING REGULAR LOWER BODY CLOTHING: A LITTLE
HELP NEEDED FOR BATHING: A LITTLE
MOVING TO AND FROM BED TO CHAIR: A LITTLE
WALKING IN HOSPITAL ROOM: A LITTLE
PERSONAL GROOMING: A LITTLE
DRESSING REGULAR UPPER BODY CLOTHING: A LITTLE
MOVING FROM LYING ON BACK TO SITTING ON SIDE OF FLAT BED WITH BEDRAILS: A LITTLE
DAILY ACTIVITIY SCORE: 22
MOVING FROM LYING ON BACK TO SITTING ON SIDE OF FLAT BED WITH BEDRAILS: A LITTLE
MOVING TO AND FROM BED TO CHAIR: A LITTLE
CLIMB 3 TO 5 STEPS WITH RAILING: A LITTLE
EATING MEALS: A LITTLE
DRESSING REGULAR UPPER BODY CLOTHING: A LITTLE
MOBILITY SCORE: 18
WALKING IN HOSPITAL ROOM: A LITTLE
HELP NEEDED FOR BATHING: A LITTLE
MOBILITY SCORE: 22
WALKING IN HOSPITAL ROOM: A LITTLE
CLIMB 3 TO 5 STEPS WITH RAILING: A LOT
STANDING UP FROM CHAIR USING ARMS: A LITTLE
TURNING FROM BACK TO SIDE WHILE IN FLAT BAD: A LITTLE
MOBILITY SCORE: 17
CLIMB 3 TO 5 STEPS WITH RAILING: A LITTLE
STANDING UP FROM CHAIR USING ARMS: A LITTLE
DAILY ACTIVITIY SCORE: 17
DAILY ACTIVITIY SCORE: 19
TOILETING: A LITTLE
TOILETING: A LITTLE
DRESSING REGULAR LOWER BODY CLOTHING: A LITTLE
TOILETING: A LITTLE
PERSONAL GROOMING: A LITTLE
PATIENT BASELINE BEDBOUND: NO
DRESSING REGULAR LOWER BODY CLOTHING: A LOT
TURNING FROM BACK TO SIDE WHILE IN FLAT BAD: A LITTLE

## 2023-11-07 ASSESSMENT — ACTIVITIES OF DAILY LIVING (ADL)
BATHING_ASSISTANCE: MINIMAL
ADEQUATE_TO_COMPLETE_ADL: YES
FEEDING YOURSELF: INDEPENDENT
ADL_ASSISTANCE: INDEPENDENT
HEARING - RIGHT EAR: FUNCTIONAL
JUDGMENT_ADEQUATE_SAFELY_COMPLETE_DAILY_ACTIVITIES: YES
GROOMING: INDEPENDENT
BATHING: INDEPENDENT
HEARING - LEFT EAR: DIFFICULTY WITH NOISE
TOILETING: NEEDS ASSISTANCE
LACK_OF_TRANSPORTATION: NO
PATIENT'S MEMORY ADEQUATE TO SAFELY COMPLETE DAILY ACTIVITIES?: YES
WALKS IN HOME: NEEDS ASSISTANCE
ASSISTIVE_DEVICE: WALKER
DRESSING YOURSELF: INDEPENDENT

## 2023-11-07 ASSESSMENT — PAIN SCALES - GENERAL
PAINLEVEL_OUTOF10: 1
PAINLEVEL_OUTOF10: 2
PAINLEVEL_OUTOF10: 4
PAINLEVEL_OUTOF10: 1
PAIN_LEVEL: 3
PAINLEVEL_OUTOF10: 2
PAINLEVEL_OUTOF10: 1
PAINLEVEL_OUTOF10: 3

## 2023-11-07 ASSESSMENT — PAIN DESCRIPTION - DESCRIPTORS
DESCRIPTORS: ACHING

## 2023-11-07 ASSESSMENT — LIFESTYLE VARIABLES
HOW MANY STANDARD DRINKS CONTAINING ALCOHOL DO YOU HAVE ON A TYPICAL DAY: 1 OR 2
HOW OFTEN DO YOU HAVE A DRINK CONTAINING ALCOHOL: MONTHLY OR LESS
SKIP TO QUESTIONS 9-10: 1
AUDIT-C TOTAL SCORE: 1
AUDIT-C TOTAL SCORE: 1
PRESCIPTION_ABUSE_PAST_12_MONTHS: NO
SUBSTANCE_ABUSE_PAST_12_MONTHS: NO
HOW OFTEN DO YOU HAVE 6 OR MORE DRINKS ON ONE OCCASION: NEVER

## 2023-11-07 ASSESSMENT — PAIN - FUNCTIONAL ASSESSMENT
PAIN_FUNCTIONAL_ASSESSMENT: 0-10

## 2023-11-07 ASSESSMENT — COLUMBIA-SUICIDE SEVERITY RATING SCALE - C-SSRS
2. HAVE YOU ACTUALLY HAD ANY THOUGHTS OF KILLING YOURSELF?: NO
6. HAVE YOU EVER DONE ANYTHING, STARTED TO DO ANYTHING, OR PREPARED TO DO ANYTHING TO END YOUR LIFE?: NO
1. IN THE PAST MONTH, HAVE YOU WISHED YOU WERE DEAD OR WISHED YOU COULD GO TO SLEEP AND NOT WAKE UP?: NO

## 2023-11-07 ASSESSMENT — PATIENT HEALTH QUESTIONNAIRE - PHQ9
SUM OF ALL RESPONSES TO PHQ9 QUESTIONS 1 & 2: 0
2. FEELING DOWN, DEPRESSED OR HOPELESS: NOT AT ALL
1. LITTLE INTEREST OR PLEASURE IN DOING THINGS: NOT AT ALL

## 2023-11-07 NOTE — CARE PLAN
Problem: Respiratory  Goal: Clear secretions with interventions this shift  Outcome: Progressing  Goal: Wean oxygen to maintain O2 saturation per order/standard this shift  Outcome: Progressing

## 2023-11-07 NOTE — PROGRESS NOTES
Occupational Therapy    Evaluation    Patient Name: Kasi Roper  MRN: [id number]  Today's Date: 11/7/2023  Time Calculation  Start Time: 1511  Stop Time: 1522  Time Calculation (min): 11 min    Assessment  IP OT Assessment  OT Assessment: 79 y/o male s/p elective spine surgery with Dr. Granger.  On eval, he requires slightly increased assist for xfers, mobility and self care d/t post op pain, weakness and decreased safety awareness. Would benefit from skilled OT services to maximize safety/IND prior to DC.  Prognosis: Excellent  Barriers to Discharge: None  Evaluation/Treatment Tolerance: Patient tolerated treatment well  Medical Staff Made Aware: Yes  End of Session Communication: Bedside nurse  End of Session Patient Position: Bed, 4 rail up, Alarm on  Plan:  Treatment Interventions: ADL retraining, Functional transfer training, Endurance training, UE strengthening/ROM, Cognitive reorientation, Patient/family training, Equipment evaluation/education, Compensatory technique education  OT Frequency: 3 times per week  OT Discharge Recommendations: Low intensity level of continued care  OT Recommended Transfer Status: Assist of 1  OT - OK to Discharge: Yes    Subjective   Current Problem:  1. Failed orthopedic implant, initial encounter (CMS/[facility])          General:  General  Reason for Referral: decreased ADLs  Referred By: Dr. Granger  Past Medical History Relevant to Rehab: montgomery esophagus, bipolar, BPH, chronic back pain, DJD, GERD  Prior to Session Communication: Bedside nurse  Patient Position Received: Bed, 3 rail up  Preferred Learning Style: verbal  General Comment: Cleared by nsg, pt met in supine, agreeable to therapy.  Precautions:  Medical Precautions: Fall precautions, Spinal precautions    Pain:  Pain Assessment  Pain Assessment: 0-10  Pain Score: 1  Pain Type: Surgical pain  Pain Location: Back    Objective   Cognition:  Overall Cognitive Status: Within Functional Limits    Home Living:  Type of Home:  House  Lives With: Spouse  Home Adaptive Equipment: None  Home Layout: Multi-level  Home Access: Level entry  Bathroom Shower/Tub: Walk-in shower  Bathroom Toilet: Standard  Bathroom Equipment: None   Prior Function:  Level of [place]: Independent with ADLs and functional transfers, Independent with homemaking with ambulation, Independent with homemaking with wheelchair    ADL:  Eating Assistance: Stand by  Eating Deficit: Setup  Bathing Assistance: Minimal (per clinical judgement)  LE Dressing Assistance: Moderate (per clinical judgement)  Toileting Assistance with Device: Minimal (per clinical judgement)  Activity Tolerance:  Endurance: Decreased tolerance for upright activites  Activity Tolerance Comments: slightly limited d/t post op pain and lethargy    Bed Mobility/Transfers:   Bed Mobility  Bed Mobility: Yes  Bed Mobility 1  Bed Mobility 1: Supine to sitting  Level of Assistance 1: Close supervision  Bed Mobility Comments 1: HOB elevated, cues for log roll technique  Bed Mobility 2  Bed Mobility  2: Sitting to supine  Level of Assistance 2: Close supervision  Bed Mobility Comments 2: cued for body mechanics    Transfers  Transfer: Yes  Transfer 1  Technique 1: Sit to stand, Stand to sit  Transfer Level of Assistance 1: Contact guard  Trials/Comments 1: STS from EOB, cues for advancement of activity and safety  Transfers 2  Technique 2:  (functional mobility)  Transfer Level of Assistance 2: Contact guard  Trials/Comments 2: ambulates short household distance at bedside without a device, CGA-min A for stability. 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, chronic)  Strength:  Strength Comments: 4/5 distally, minimal pressure applied to maintain spine precautions  Hand Function:  Hand Function  Gross Grasp:  Functional  Coordination: Functional  Extremities: RUE   RUE : Within Functional Limits and LUE   LUE: Within Functional Limits    Outcome Measures: [facility] Daily Activity  Putting on and taking off regular lower body clothing: A lot  Bathing (including washing, rinsing, drying): A little  Putting on and taking off regular upper body clothing: A little  Toileting, which includes using toilet, bedpan or urinal: A little  Taking care of personal grooming such as brushing teeth: A little  Eating Meals: A little  Daily Activity - Total Score: 17      Education Documentation  Body Mechanics, taught by Live Young OT at 11/7/2023  3:33 PM.  Learner: Patient  Readiness: Acceptance  Method: Explanation  Response: Needs Reinforcement    Precautions, taught by Live Young OT at 11/7/2023  3:33 PM.  Learner: Patient  Readiness: Acceptance  Method: Explanation  Response: Needs Reinforcement    Education Comments  No comments found.      Goals:   Encounter Problems       Encounter Problems (Active)       OT Goals       ADLs (Progressing)       Start:  11/07/23    Expected End:  11/21/23       Pt will perform ADLs at MOD I using AE/compensatory techniques as needed.         Functional Mobility (Progressing)       Start:  11/07/23    Expected End:  11/21/23       Patient will perform functional xfers/mobility at MOD I using LRD.         Precautions (Progressing)       Start:  11/07/23    Expected End:  11/21/23       Patient will independently verbalize post op precautions in preparation for ADLs.

## 2023-11-07 NOTE — CARE PLAN
Problem: OT Goals  Goal: ADLs  Description: Pt will perform ADLs at MOD I using AE/compensatory techniques as needed.  Outcome: Progressing  Goal: Functional Mobility  Description: Patient will perform functional xfers/mobility at MOD I using LRD.  Outcome: Progressing  Goal: Precautions  Description: Patient will independently verbalize post op precautions in preparation for ADLs.  Outcome: Progressing

## 2023-11-07 NOTE — ANESTHESIA POSTPROCEDURE EVALUATION
Patient: aKsi Roper    Procedure Summary       Date: 11/07/23 Room / Location: [facility] OR 07 / Virtual [facility] OR    Anesthesia Start: 1140 Anesthesia Stop: 1304    Procedure: Removal Left T11-12 screws and Richie (Left: Spine Thoracic) Diagnosis:       Failed orthopedic implant, sequela      (Failed orthopedic implant, sequela [T84.498S])    Surgeons: Kasi Granger MD Responsible Provider: Skyler Cano MD    Anesthesia Type: general ASA Status: 2            Anesthesia Type: general    Vitals Value Taken Time   /88 11/07/23 1253   Temp 36 °C (96.8 °F) 11/07/23 1253   Pulse 69 11/07/23 1253   Resp 16 11/07/23 1253   SpO2 92 % 11/07/23 1253       Anesthesia Post Evaluation    Patient location during evaluation: PACU  Patient participation: complete - patient participated  Level of consciousness: awake and alert  Pain score: 3  Pain management: adequate  Multimodal analgesia pain management approach  Airway patency: patent  Two or more strategies used to mitigate risk of obstructive sleep apnea  Cardiovascular status: acceptable and blood pressure returned to baseline  Respiratory status: acceptable  Hydration status: acceptable  Comments: PONV absent        No notable events documented.

## 2023-11-07 NOTE — ANESTHESIA PREPROCEDURE EVALUATION
Patient: Kasi Roper    Procedure Information       Date/Time: 11/07/23 0945    Procedure: Removal Left T11-12 screws and Richie (Left: Spine Thoracic) - [employer] Removal Instruments ([employer] will provide)    Location: [facility] / [facility]    Surgeons: Kasi Granger MD            Relevant Problems   No relevant active problems       Clinical information reviewed:   Tobacco  Allergies  Meds   Med Hx  Surg Hx   Fam Hx  Soc Hx        NPO Detail:  NPO/Void Status  Date of Last Liquid: 11/06/23  Time of Last Liquid: 2100  Date of Last Solid: 11/06/23  Time of Last Solid: 1900  Time of Last Void: 0600         Physical Exam    Airway  Mallampati: I  TM distance: >3 FB  Neck ROM: full     Cardiovascular   Comments: deferred   Dental    Pulmonary   Comments: deferred   Abdominal     Comments: deferred           Anesthesia Plan    ASA 2     general     The patient is not a current smoker.  Patient was not previously instructed to abstain from smoking on day of procedure.  Patient did not smoke on day of procedure.    intravenous induction   Postoperative administration of opioids is intended.  Anesthetic plan and risks discussed with patient.

## 2023-11-07 NOTE — OP NOTE
Removal Left T11-12 screws and Richie (L) Operative Note   Surgeon:  Kasi Granger MD  Asst:  [facility]      Operation: Removal of left T11 and T12 pedicle screws and rods    Anesthesia: General ET    Preoperative Diagnosis: Failed Ortho implant, loose screws and painful hardware  Postoperative Diagnosis: Same    Operative Indications:  The patient was referred for evaluation and management of palpable painful subcutaneous thoracic spinal hardware.  The natural history of this disease was explained to the patient at length, as well as the treatment options.    The patient had undergone a thoracolumbar additional fusion surgery 1 year ago at another institution by another surgeon.  He subsequently has had progressively more prominent proximal left spinal hardware subcutaneously which is painful.  Surgeon has resisted intervention.  Okay the left richie at T11 is immediately visible palpable and tender.  The T12 screw was also prominent.  The right richie and screws have no prominence at all.  CT scan shows that the T11 screw was grossly loose and has migrated posteriorly causing the prominence.  Patient has requested removal of the painful prominent hardware.    Operative Findings: Completely loose T11 screw and moderately loose to the 12 screw.    Operative Procedure:  After informed consent was obtained, the patient was taken to the operating room.  A time-out was performed where the patient and procedure were identified in the presence of Nursing, Anesthesia, and surgical staff.  After the placement of lines and monitors, general anesthesia was induced.  Prophylactic antibiotics were administered.  Sequential compressive devices were placed on both lower extremities.  The patient was placed in the prone position on a Luis Alfredo frame. Patient was prepped and draped in a standard sterile fashion.    The hardware was immediately visible and prominent subcutaneously in this very thin man.  We reopened the prior midline  incision from the L1 level up proximal to T11.  Dissected through the subcutaneous tissue with cautery down to the hardware and stripped it.  Hemostasis was achieved with cautery.  We found no discoloration or fluid or purulence around the hardware.  We removed the locking caps from T11 and T12 on this left side.  We used a lyssa padilla to lift the lyssa up out of the screw heads from the T12-L1 segment.  The T11 screw pulled directly out.  The T12 screw had minimal resistance to torqued and came out very easily.  There was no significant bleeding or fluid from the bone hole.  We then bent the proximal end of the lyssa up as high as possible and used a  to cut it off below the T12 screw and then the lyssa was bent back down in 2 curved directly into the spine bone.  We removed the retractors and could visibly and palpably see that there was no prominence on the left side any longer.  We pulse lavage irrigated the wound with antibiotic solution.  We placed surgical foam into the bone holes.  We placed vancomycin powder in the deep and the superficial spaces.  We closed the fascia with interrupted 1 Vicryl.  Subtenons tissue with 2-0 Vicryl and the skin closed with staples.  Sterile dressings were applied.  Patient was turned supine into bed awakened and extubated    Dr. Granger was present throughout the entire procedure and performed all critical steps.    Estimated Blood Loss: 5 ml    Fluids: per anesthesia    Drains: no    Specimens: no    Complications: no    Disposition:  To recovery room in stable condition.

## 2023-11-07 NOTE — ANESTHESIA PROCEDURE NOTES
Airway  Date/Time: 11/7/2023 11:50 AM  Urgency: elective    Airway not difficult    Staffing  Performed: attending   Authorized by: Skyler Cano MD    Performed by: Skyler Cano MD  Patient location during procedure: OR    Indications and Patient Condition  Indications for airway management: anesthesia and airway protection  Spontaneous ventilation: present  Sedation level: deep  Preoxygenated: yes  Patient position: sniffing  MILS maintained throughout  Mask difficulty assessment: 1 - vent by mask  Planned trial extubation    Final Airway Details  Final airway type: endotracheal airway      Successful airway: ETT  Cuffed: yes   Successful intubation technique: video laryngoscopy  Endotracheal tube insertion site: oral  Blade: Toy  Blade size: #3  ETT size (mm): 7.5  Cormack-Lehane Classification: grade I - full view of glottis  Placement verified by: chest auscultation and capnometry   Cuff volume (mL): 8  Measured from: gums  ETT to gums (cm): 22  Number of attempts at approach: 1  Ventilation between attempts: none  Number of other approaches attempted: 0

## 2023-11-07 NOTE — PROGRESS NOTES
Physical Therapy    Physical Therapy Evaluation    Patient Name: Kasi Roper  MRN: [id number]  Today's Date: 11/7/2023   Time Calculation  Start Time: 1506  Stop Time: 1523  Time Calculation (min): 17 min    78 year old male admits sp Removal of L T11-12 Screws and L lyssa completed by Dr. Granger. Message sent to Dr. Granger to confirm need for spine precautions moving forward. Spine precautions are used today to ensure safe mobility at this time. Will confirm with tomorrow's post op mobility but likely no rehab needs at [place].     Assessment/Plan   PT Assessment  PT Assessment Results: Decreased endurance, Impaired balance, Decreased mobility, Decreased safety awareness, Orthopedic restrictions, Pain  Rehab Prognosis: Excellent  Evaluation/Treatment Tolerance: Patient tolerated treatment well  Medical Staff Made Aware: Yes  End of Session Communication: Bedside nurse  End of Session Patient Position: Bed, 3 rail up, Alarm on  IP OR SWING BED PT PLAN  Inpatient or Swing Bed: Inpatient  PT Plan  Treatment/Interventions: Bed mobility, Transfer training, Gait training, Stair training, Balance training, Endurance training, Therapeutic activity  PT Plan: Skilled PT  PT Frequency: 6 times per week  PT Discharge Recommendations: No PT needed after discharge  Equipment Recommended upon Discharge:  (TBD tomorrow at follow up during mobility)  PT Recommended Transfer Status: Assist x1  PT - OK to Discharge: Yes      Subjective   General Visit Information:  General  Reason for Referral: Recent Surg  Referred By: Dr. Granger  Family/Caregiver Present: No  Co-Treatment: OT  Prior to Session Communication: Bedside nurse  Patient Position Received: Bed, 3 rail up  Preferred Learning Style: verbal  General Comment: Admits sp Removal of L T11-12 Screws and L lyssa completed by Dr. Granger  Home Living:  Home Living  Type of Home: House  Lives With: Spouse  Home Adaptive Equipment: None  Home Layout: Multi-level, Stairs to alternate level with  rails  Alternate Level Stairs-Rails: Right  Alternate Level Stairs-Number of Steps: 7  Home Access: No concerns  Bathroom Shower/Tub: Walk-in shower  Prior Level of Function:  Prior Function Per Pt/Caregiver Report  Level of [place]: Independent with ADLs and functional transfers, Independent with homemaking with ambulation  Receives Help From: Family  ADL Assistance: Independent  Homemaking Assistance: Independent  Ambulatory Assistance: Independent  Precautions:  Precautions  Medical Precautions: Spinal precautions, Fall precautions  Precautions Comment: Message placed to Dr. Granger to confirm necessity of spine precautions post op. Used today for safety until this can be confirmed    Objective   Pain:  Pain Assessment  Pain Assessment: 0-10  Pain Score: 1  Pain Type: Surgical pain  Pain Location: Back  Pain Interventions: Ambulation/increased activity  Cognition:  Cognition  Overall Cognitive Status: Within Functional Limits    General Assessments:  Activity Tolerance  Endurance: Decreased tolerance for upright activites    Sensation  Light Touch: Partial deficits in the RLE, Partial deficits in the LLE (chronic B foot numbness per Pt)  Functional Assessments:  Bed Mobility  Bed Mobility: Yes  Bed Mobility 1  Bed Mobility 1: Supine to sitting  Level of Assistance 1: Close supervision  Bed Mobility Comments 1: cues for log roll and spine precautions  Bed Mobility 2  Bed Mobility  2: Sitting to supine  Level of Assistance 2: Close supervision  Bed Mobility Comments 2: cues for log roll and spine precautions    Transfers  Transfer: Yes  Transfer 1  Transfer From 1: Bed to  Transfer to 1: Stand  Technique 1: Sit to stand  Transfer Device 1:  (no AD)  Transfer Level of Assistance 1: Contact guard  Trials/Comments 1: cues for safety    Ambulation/Gait Training  Ambulation/Gait Training Performed: Yes  Ambulation/Gait Training 1  Surface 1: Level tile  Device 1: No device  Assistance 1: Contact guard  Quality of  Gait 1:  (slow, laborous, mild unsteadiness)  Comments/Distance (ft) 1: 3' sideways along EOB  Ambulation/Gait Training 2  Surface 2: Level tile  Device 2: No device  Assistance 2: Contact guard (X2)  Quality of Gait 2:  (slow, laborous, mild unsteadiness)  Comments/Distance (ft) 2: 10  Extremity/Trunk Assessments:  RLE   RLE : Within Functional Limits  LLE   LLE : Within Functional Limits  Outcome Measures:  [facility] Basic Mobility  Turning from your back to your side while in a flat bed without using bedrails: A little  Moving from lying on your back to sitting on the side of a flat bed without using bedrails: A little  Moving to and from bed to chair (including a wheelchair): A little  Standing up from a chair using your arms (e.g. wheelchair or bedside chair): A little  To walk in hospital room: A little  Climbing 3-5 steps with railing: A little  Basic Mobility - Total Score: 18    Encounter Problems       Encounter Problems (Active)       Balance       Standing Balance (Progressing)       Start:  11/07/23    Expected End:  11/21/23       Pt will demonstrate good static standing balance to promote safe participation with out of bed activity, transfers, and mobility              Mobility       Ambulation (Progressing)       Start:  11/07/23    Expected End:  11/21/23       Pt will ambulate 50' independent assist with LRD to promote safe home mobility           Stair Negotiation (Progressing)       Start:  11/07/23    Expected End:  11/21/23       Pt will ascend/descend 7 stairs with rail(s) on R  with modified independent assist and least restrictive device to promote safe navigation at home between floors              Safety       Safe Mobility Techniques (Progressing)       Start:  11/07/23    Expected End:  11/21/23       Pt will correctly identify and demonstrate safe mobility techniques to reduce their risks for falls during their acute care stay            Spine Precautions (Progressing)       Start:   11/07/23    Expected End:  11/21/23       Pt will be independent with spine precaution understanding and demonstration during functional mobility practice to promote safety at DC              Transfers       Supine to sit (Progressing)       Start:  11/07/23    Expected End:  11/21/23       Pt will transfer supine to sitting at edge of bed with modified independent assist to promote acute care out of bed activity           Sit to stand (Progressing)       Start:  11/07/23    Expected End:  11/21/23       Pt will transfer sit to standing position with modified independent assist and LRD to promote safe out of bed activity                  Education Documentation  No documentation found.  Education Comments  No comments found.

## 2023-11-08 ENCOUNTER — PHARMACY VISIT (OUTPATIENT)
Dept: PHARMACY | Facility: CLINIC | Age: 78
End: 2023-11-08
Payer: COMMERCIAL

## 2023-11-08 VITALS
TEMPERATURE: 98.4 F | SYSTOLIC BLOOD PRESSURE: 95 MMHG | HEIGHT: 69 IN | OXYGEN SATURATION: 95 % | RESPIRATION RATE: 16 BRPM | DIASTOLIC BLOOD PRESSURE: 70 MMHG | BODY MASS INDEX: 25.11 KG/M2 | WEIGHT: 169.53 LBS | HEART RATE: 68 BPM

## 2023-11-08 PROCEDURE — 2500000004 HC RX 250 GENERAL PHARMACY W/ HCPCS (ALT 636 FOR OP/ED): Performed by: ORTHOPAEDIC SURGERY

## 2023-11-08 PROCEDURE — RXMED WILLOW AMBULATORY MEDICATION CHARGE

## 2023-11-08 PROCEDURE — 97110 THERAPEUTIC EXERCISES: CPT | Mod: GP,CQ

## 2023-11-08 PROCEDURE — 51701 INSERT BLADDER CATHETER: CPT

## 2023-11-08 PROCEDURE — 2500000001 HC RX 250 WO HCPCS SELF ADMINISTERED DRUGS (ALT 637 FOR MEDICARE OP): Performed by: ORTHOPAEDIC SURGERY

## 2023-11-08 PROCEDURE — 97116 GAIT TRAINING THERAPY: CPT | Mod: GP,CQ

## 2023-11-08 PROCEDURE — 97530 THERAPEUTIC ACTIVITIES: CPT | Mod: GP,CQ

## 2023-11-08 PROCEDURE — 97535 SELF CARE MNGMENT TRAINING: CPT | Mod: GO,CO

## 2023-11-08 RX ORDER — HYDROCODONE BITARTRATE AND ACETAMINOPHEN 5; 325 MG/1; MG/1
1-2 TABLET ORAL EVERY 4 HOURS PRN
Qty: 42 TABLET | Refills: 0 | Status: SHIPPED | OUTPATIENT
Start: 2023-11-08

## 2023-11-08 RX ORDER — HYDROCODONE BITARTRATE AND ACETAMINOPHEN 5; 325 MG/1; MG/1
1 TABLET ORAL EVERY 4 HOURS PRN
Status: DISCONTINUED | OUTPATIENT
Start: 2023-11-08 | End: 2023-11-08 | Stop reason: HOSPADM

## 2023-11-08 RX ORDER — GUAIFENESIN 600 MG/1
600 TABLET, EXTENDED RELEASE ORAL 2 TIMES DAILY PRN
Refills: 0
Start: 2023-11-08

## 2023-11-08 RX ORDER — FLUTICASONE PROPIONATE 50 MCG
2 SPRAY, SUSPENSION (ML) NASAL NIGHTLY PRN
Qty: 16 G | Refills: 12 | Status: SHIPPED | OUTPATIENT
Start: 2023-11-08

## 2023-11-08 RX ORDER — BUPROPION HYDROCHLORIDE 150 MG/1
150 TABLET ORAL EVERY MORNING
Refills: 0
Start: 2023-11-08

## 2023-11-08 RX ORDER — HYDROCODONE BITARTRATE AND ACETAMINOPHEN 5; 325 MG/1; MG/1
2 TABLET ORAL EVERY 4 HOURS PRN
Status: DISCONTINUED | OUTPATIENT
Start: 2023-11-08 | End: 2023-11-08 | Stop reason: HOSPADM

## 2023-11-08 RX ADMIN — PANTOPRAZOLE SODIUM 40 MG: 40 TABLET, DELAYED RELEASE ORAL at 06:30

## 2023-11-08 RX ADMIN — DOCUSATE SODIUM 100 MG: 100 CAPSULE, LIQUID FILLED ORAL at 08:08

## 2023-11-08 RX ADMIN — POLYETHYLENE GLYCOL 3350 17 G: 17 POWDER, FOR SOLUTION ORAL at 08:08

## 2023-11-08 RX ADMIN — FLUVOXAMINE MALEATE 150 MG: 50 TABLET, COATED ORAL at 08:08

## 2023-11-08 RX ADMIN — BUPROPION HYDROCHLORIDE 150 MG: 150 TABLET, EXTENDED RELEASE ORAL at 08:08

## 2023-11-08 ASSESSMENT — COGNITIVE AND FUNCTIONAL STATUS - GENERAL
TOILETING: A LITTLE
HELP NEEDED FOR BATHING: A LITTLE
MOBILITY SCORE: 21
DAILY ACTIVITIY SCORE: 21
STANDING UP FROM CHAIR USING ARMS: A LITTLE
CLIMB 3 TO 5 STEPS WITH RAILING: A LITTLE
WALKING IN HOSPITAL ROOM: A LITTLE
DRESSING REGULAR LOWER BODY CLOTHING: A LITTLE

## 2023-11-08 ASSESSMENT — PAIN DESCRIPTION - LOCATION: LOCATION: ABDOMEN

## 2023-11-08 ASSESSMENT — PAIN SCALES - GENERAL
PAINLEVEL_OUTOF10: 3
PAINLEVEL_OUTOF10: 2
PAINLEVEL_OUTOF10: 3

## 2023-11-08 ASSESSMENT — PAIN - FUNCTIONAL ASSESSMENT
PAIN_FUNCTIONAL_ASSESSMENT: 0-10

## 2023-11-08 NOTE — CARE PLAN
The patient's goals for the shift include pain control    The clinical goals for the shift include pain control      11/07/23 at 7:50 PM - Cathleen James RN

## 2023-11-08 NOTE — NURSING NOTE
----- Message from Marcelina Cui sent at 11/3/2021  9:12 AM EDT -----  Subject: Message to Provider    QUESTIONS  Information for Provider? [facility] called and said   referral was sent to wrong place. States this provider only comes to this   office once a month. It needs to be sent to the office in [facility]. She did   not have a fax number but contact number is [phone]  ---------------------------------------------------------------------------  --------------  [address]  What is the best way for the office to contact you? OK to leave message on   voicemail  Preferred Call Back Phone Number? [phone]  ---------------------------------------------------------------------------  --------------  SCRIPT ANSWERS  Relationship to Patient?  Self Patient retaining urine. Mentioned he feels like he isn't fully emptying his bladder and has the urge to urinate. Bladder scanned patient and patient has 768mL in bladder, going to call Dr. Granger.

## 2023-11-08 NOTE — DISCHARGE SUMMARY
Discharge Diagnosis  Failed orthopedic implant (CMS/[facility])    Issues Requiring Follow-Up  Staples to be removed    Test Results Pending At Discharge  Pending Labs       No current pending labs.            Hospital Course   November 7 patient underwent removal of the left T11 and 12 screws in the top of the left lyssa.  No complications.  Minimal blood loss.  Resumed normal function postop.  Use PCA morphine overnight.  Had urinary retention treated with a straight cath [place].  Then he resumed normal urination and control.    Pertinent Physical Exam At Time of Discharge  Physical Exam    Home Medications     Medication List      START taking these medications     HYDROcodone-acetaminophen 5-325 mg tablet; Commonly known as: Norco;   Take 1-2 tablets by mouth every 4 hours if needed for moderate pain (4 -   6). May give 1-2 tabs  every 4-6 hours PRN     CONTINUE taking these medications     buPROPion  mg 24 hr tablet; Commonly known as: Wellbutrin XL; Take   1 tablet (150 mg) by mouth once daily in the morning. Do not crush, chew,   or split.   chlorhexidine 0.12 % solution; Commonly known as: Peridex; Use cap to   measure 15 mL.  Swish/gargle mouthwash for at least 30 seconds.  Do not   swallow.  Use night before surgery after brushing teeth and morning of   surgery after brushing teeth.   cholecalciferol 5,000 Units tablet; Commonly known as: Vitamin D-3   fluticasone 50 mcg/actuation nasal spray; Commonly known as: Flonase;   Administer 2 sprays into each nostril as needed at bedtime for rhinitis.   Shake gently. Before first use, prime pump. After use, clean tip and   replace cap.   * fLuvoxaMINE 100 mg tablet; Commonly known as: Luvox   * fLuvoxaMINE 100 mg tablet; Commonly known as: Luvox   guaiFENesin 600 mg 12 hr tablet; Commonly known as: Mucinex; Take 1   tablet (600 mg) by mouth 2 times a day as needed for cough. Do not crush,   chew, or split.   pantoprazole 40 mg EC tablet; Commonly known as: ProtoNix    UROXATRAL ORAL  * This list has 2 medication(s) that are the same as other medications   prescribed for you. Read the directions carefully, and ask your doctor or   other care provider to review them with you.       Outpatient Follow-Up  Follow-up 3 weeks postop for staple removal    Kasi Granger MD   [facility]

## 2023-11-08 NOTE — PROGRESS NOTES
Physical Therapy    Physical Therapy Treatment    Patient Name: Kasi Roper  MRN: [id number]  Today's Date: 11/8/2023  Time Calculation  Start Time: 0839  Stop Time: 0926  Time Calculation (min): 47 min       Assessment/Plan   PT Assessment  PT Assessment Results: Decreased endurance, Impaired balance, Decreased mobility, Decreased safety awareness, Orthopedic restrictions, Pain  Rehab Prognosis: Excellent  Evaluation/Treatment Tolerance: Patient tolerated treatment well  Medical Staff Made Aware: Yes  End of Session Communication: Bedside nurse  End of Session Patient Position: Up in chair  PT Plan  Inpatient/Swing Bed or Outpatient: Inpatient  PT Plan  Treatment/Interventions: Transfer training, Gait training, Stair training, Therapeutic exercise  PT Plan: Skilled PT  PT Frequency: 6 times per week  PT Discharge Recommendations: No PT needed after discharge  Equipment Recommended upon Discharge: Wheeled walker (Pt was fit with and issued RW for home. Pt educated on safety and use, handout given.)  PT Recommended Transfer Status: Assist x1  PT - OK to Discharge: Yes      General Visit Information:   PT  Visit  PT Received On: 11/08/23  Response to Previous Treatment: Patient with no complaints from previous session.  General  Prior to Session Communication: Bedside nurse  Patient Position Received: Up in chair  Preferred Learning Style: verbal, written  General Comment: Agreeable to treatment    Subjective   Precautions:  Precautions  Medical Precautions: Spinal precautions, Fall precautions  Post-Surgical Precautions: Spinal precautions  Precautions Comment: Message placed to Dr. Granger to confirm necessity of spine precautions post op. Used today for safety until this can be confirmed  Vital Signs:       Objective   Pain:  Pain Assessment  Pain Assessment: 0-10  Pain Score: 2  Pain Type: Surgical pain  Pain Location: Back  Cognition:     Postural Control:     Extremity/Trunk Assessments:    Activity  Tolerance:     Treatments:  Therapeutic Exercise  Therapeutic Exercise Performed: Yes  Therapeutic Exercise Activity 1: Ankle pumps/heel raises, LAQ, seated hip flexion, mary hip adduction, resisted hip abduction bilat x15    Ambulation/Gait Training  Ambulation/Gait Training Performed: Yes  Ambulation/Gait Training 1  Surface 1: Level tile  Device 1: Rolling walker  Assistance 1: Distant supervision  Comments/Distance (ft) 1: Pt ambulated with RW ~150' x1 and 350' x1 distant supervision. Pt demonstrates slow, reciprocal gait. No LOB.  Transfers  Transfer: Yes  Transfer 1  Technique 1: Sit to stand, Stand to sit  Transfer Level of Assistance 1: Close supervision    Stairs  Stairs: Yes  Stairs  Rails 1: Right  Curb Step 1: No  Device 1: Railing  Assistance 1: Close supervision  Comment/Number of Steps 1: Up/down 4 steps x3 trials with one rail, close supervision. Pt demonstrating non reciprocal pattern first trial and able to demonstrate reciprocal pattern last two trials.    Outcome Measures:  [facility] Basic Mobility  Turning from your back to your side while in a flat bed without using bedrails: None  Moving from lying on your back to sitting on the side of a flat bed without using bedrails: None  Moving to and from bed to chair (including a wheelchair): None  Standing up from a chair using your arms (e.g. wheelchair or bedside chair): A little  To walk in hospital room: A little  Climbing 3-5 steps with railing: A little  Basic Mobility - Total Score: 21    Education Documentation  No documentation found.  Education Comments  No comments found.        OP EDUCATION:  Education  Individual(s) Educated: Patient  Education Provided: Home Exercise Program    Encounter Problems       Encounter Problems (Active)       Balance       Standing Balance (Progressing)       Start:  11/07/23    Expected End:  11/21/23       Pt will demonstrate good static standing balance to promote safe participation with out of bed activity,  transfers, and mobility              Mobility       Ambulation (Progressing)       Start:  11/07/23    Expected End:  11/21/23       Pt will ambulate 50' independent assist with LRD to promote safe home mobility           Stair Negotiation (Progressing)       Start:  11/07/23    Expected End:  11/21/23       Pt will ascend/descend 7 stairs with rail(s) on R  with modified independent assist and least restrictive device to promote safe navigation at home between floors              Safety       Safe Mobility Techniques (Progressing)       Start:  11/07/23    Expected End:  11/21/23       Pt will correctly identify and demonstrate safe mobility techniques to reduce their risks for falls during their acute care stay            Spine Precautions (Progressing)       Start:  11/07/23    Expected End:  11/21/23       Pt will be independent with spine precaution understanding and demonstration during functional mobility practice to promote safety at DC              Transfers       Supine to sit (Progressing)       Start:  11/07/23    Expected End:  11/21/23       Pt will transfer supine to sitting at edge of bed with modified independent assist to promote acute care out of bed activity           Sit to stand (Progressing)       Start:  11/07/23    Expected End:  11/21/23       Pt will transfer sit to standing position with modified independent assist and LRD to promote safe out of bed activity

## 2023-11-08 NOTE — PROGRESS NOTES
1 postop removal of left T11 and 12 screws and lyssa.  Patient is up moving with minimal assistance with a walker.  He is afebrile with stable vital signs.  He had urinary retention last night and was straight cathed.  Subsequently he has been voiding normally at good volumes.  His dressing is dry and clean.  Neuro exam is normal.  He is comfortable, feeling better than he did preop.    We have stopped the PCA.  He is working with therapy.  He will be discharged home this morning with Norco.  Keep his dressing dry for 4 days then may shower.  Follow-up 3 weeks postop for staple removal

## 2023-11-08 NOTE — PROGRESS NOTES
Occupational Therapy    OT Treatment    Patient Name: Kasi Roper  MRN: [id number]  Today's Date: 11/8/2023  Time Calculation  Start Time: 0716  Stop Time: 0755  Time Calculation (min): 39 min         Assessment:  OT Assessment: Pt functioning at Close S/CGA level, demonsrates good carry over of learned transfer technqiues  End of Session Patient Position: Up in chair     Plan:  Treatment Interventions: ADL retraining, Functional transfer training, Endurance training  OT Frequency: 3 times per week  OT Discharge Recommendations: Low intensity level of continued care  OT Recommended Transfer Status: Stand by assist  OT - OK to Discharge: Yes  Treatment Interventions: ADL retraining, Functional transfer training, Endurance training    Subjective   General:  OT Received On: 11/08/23  Reason for Referral: decreased ADLs  Referred By: Dr. Granger  Past Medical History Relevant to Rehab: montgomery esophagus, bipolar, BPH, chronic back pain, DJD, GERD  Prior to Session Communication: Bedside nurse  Patient Position Received: Bed, 3 rail up  General Comment: Agreeable to treatment, IV intact  Precautions:  Post-Surgical Precautions: Spinal precautions  Precautions Comment: spine precaution hand out reviewed and provided to pt  Vital Signs:     Pain:  Pain Assessment  Pain Assessment: 0-10  Pain Score: 3  Pain Type: Surgical pain  Pain Location: Back    Objective       Activities of Daily Living: Grooming  Grooming Level of Assistance: Close supervision  Grooming Where Assessed: Standing sinkside  Grooming Comments:  (oral care, washing face, instruction in adaptive stratagies  to adhere to spine precautions)    LE Dressing  LE Dressing: Yes  Pants Level of Assistance: Close supervision  Sock Level of Assistance: Close supervision  LE Dressing Where Assessed: Chair  LE Dressing Comments: pt doffed/donned socks, donned underwear, pants figure 4 technique    Toileting  Toileting Level of Assistance: Contact guard  Where  Assessed: Toilet  Toileting Comments: in stance, FWW over toilet, hygiene only  Functional Standing Tolerance:  Time: 10 minutes  Activity: self care, conversing with physician  Functional Standing Tolerance Comments: fair balance, fair standing tolerance  Bed Mobility/Transfers: Bed Mobility 1  Bed Mobility 1: Supine to sitting  Level of Assistance 1: Close supervision  Bed Mobility Comments 1: cues for log roll    Transfer 1  Transfer From 1: Bed to  Transfer to 1: Chair with arms  Technique 1: Sit to stand, Stand to sit  Transfer Device 1: Walker  Transfer Level of Assistance 1: Contact guard  Trials/Comments 1: Functional mobility ~ 10 feet x2 from bed>bathoom>chair, cues for safe hand placement.    Toilet Transfers  Toilet Transfer From: Walker  Toilet Transfer Type: To and from  Toilet Transfer to: Standard toilet  Toilet Transfer Technique: Ambulating  Toilet Transfers: Contact guard  Toilet Transfers Comments: standing for urination  Car Transfers  Car Transfers Comments: therapist instructs in correct form to adhere to spine precautions  Outcome Measures:[facility] Daily Activity  Putting on and taking off regular lower body clothing: A little  Bathing (including washing, rinsing, drying): A little  Putting on and taking off regular upper body clothing: None  Toileting, which includes using toilet, bedpan or urinal: A little  Taking care of personal grooming such as brushing teeth: None  Eating Meals: None  Daily Activity - Total Score: 21        Education Documentation  Handouts, taught by ENRICO Reina at 11/8/2023  9:42 AM.  Learner: Patient  Readiness: Acceptance  Method: Explanation, Demonstration, Handout  Response: Demonstrated Understanding, Verbalizes Understanding    Body Mechanics, taught by ENRICO Reina at 11/8/2023  9:42 AM.  Learner: Patient  Readiness: Acceptance  Method: Explanation, Demonstration, Handout  Response: Demonstrated Understanding, Verbalizes Understanding    Precautions,  taught by ENRICO Reina at 11/8/2023  9:42 AM.  Learner: Patient  Readiness: Acceptance  Method: Explanation, Demonstration, Handout  Response: Demonstrated Understanding, Verbalizes Understanding    ADL Training, taught by ENRICO Reina at 11/8/2023  9:42 AM.  Learner: Patient  Readiness: Acceptance  Method: Explanation, Demonstration, Handout  Response: Demonstrated Understanding, Verbalizes Understanding    Education Comments  No comments found.        OP EDUCATION:  Education  Individual(s) Educated: Patient  Education Provided: Fall precautons, Risk and benefits of OT discussed with patient or other, POC discussed and agreed upon  Risk and Benefits Discussed with Patient/Caregiver/Other: yes  Patient/Caregiver Demonstrated Understanding: yes  Plan of Care Discussed and Agreed Upon: yes  Patient Response to Education: Patient/Caregiver Verbalized Understanding of Information    Goals:  Encounter Problems       Encounter Problems (Active)           OT Goals       ADLs (Progressing)       Start:  11/07/23    Expected End:  11/21/23       Pt will perform ADLs at MOD I using AE/compensatory techniques as needed.         Functional Mobility (Progressing)       Start:  11/07/23    Expected End:  11/21/23       Patient will perform functional xfers/mobility at MOD I using LRD.         Precautions (Progressing)       Start:  11/07/23    Expected End:  11/21/23       Patient will independently verbalize post op precautions in preparation for ADLs.            Safety       Safe Mobility Techniques (Progressing)       Start:  11/07/23    Expected End:  11/21/23       Pt will correctly identify and demonstrate safe mobility techniques to reduce their risks for falls during their acute care stay            Spine Precautions (Progressing)       Start:  11/07/23    Expected End:  11/21/23       Pt will be independent with spine precaution understanding and demonstration during functional mobility practice to promote  safety at DC              Transfers       Supine to sit (Progressing)       Start:  11/07/23    Expected End:  11/21/23       Pt will transfer supine to sitting at edge of bed with modified independent assist to promote acute care out of bed activity           Sit to stand (Progressing)       Start:  11/07/23    Expected End:  11/21/23       Pt will transfer sit to standing position with modified independent assist and LRD to promote safe out of bed activity

## 2023-11-16 ENCOUNTER — TELEPHONE (OUTPATIENT)
Dept: INPATIENT UNIT | Facility: HOSPITAL | Age: 78
End: 2023-11-16

## (undated) DEVICE — SUTURE, VICRYL, 2-0, 27 IN, CP-1, UNDYED

## (undated) DEVICE — Device

## (undated) DEVICE — HEMOSTATIC, MATRIX, SURGIFLO, 8ML

## (undated) DEVICE — ELECTRODE, ELECTROSURGICAL, BLADE, EXTENDED

## (undated) DEVICE — SUTURE, VICRYL, 1, 36 IN, CT-1, UNDYED

## (undated) DEVICE — COUNTER, NEEDLE, FOAM BLOCK, POP-N-COUNT, W/BLADEGUARD, W/ADHESIVE 40 COUNT, RED

## (undated) DEVICE — DRAPE, SHEET, 17 X 23 IN

## (undated) DEVICE — GLOVE, PROTEXIS PI CLASSIC, SZ-8.0, PF, PF, LF

## (undated) DEVICE — SPONGE, HEMOSTATIC, GELATIN, SURGIFOAM, 8.5 X 12 CM X 2 MM

## (undated) DEVICE — DRAPE PACK, BASIC VI, AURORA, 50 X 90IN

## (undated) DEVICE — BUR, ROUND, AGGRESSIVE, FLUTED, 4.0MM

## (undated) DEVICE — SOLUTION, IRRIGATION, X RX SODIUM CHL 0.9%, 1000ML BTL

## (undated) DEVICE — SOLUTION, INJECTION, 0.9% SODIUM CHL, USP LIFECARE 1000 MI

## (undated) DEVICE — SUTURE, PDS II, 1 X 96IN, CCS-1, VIOLET

## (undated) DEVICE — DRESSING, TRANSPARENT, TEGADERM, 8 X 12 IN

## (undated) DEVICE — GLOVE, PROTEXIS PI CLASSIC, SZ-7.5, PF, LF

## (undated) DEVICE — DRAPE, SHEET, U, W/ADHESIVE STRIP, IMPERVIOUS, 60 X 70 IN, DISPOSABLE, LF, STERILE

## (undated) DEVICE — TIP, SUCTION, FRAZIER, W/CONTROL VENT, 12 FR

## (undated) DEVICE — CATHETER, IV, JELCO, 14G X 1.25IN, W/O SAFETY

## (undated) DEVICE — SPONGE GAUZE, XRAY RFD, 8X4 12 PLY